# Patient Record
Sex: FEMALE | Race: WHITE | HISPANIC OR LATINO | Employment: UNEMPLOYED | URBAN - METROPOLITAN AREA
[De-identification: names, ages, dates, MRNs, and addresses within clinical notes are randomized per-mention and may not be internally consistent; named-entity substitution may affect disease eponyms.]

---

## 2019-03-08 ENCOUNTER — HOSPITAL ENCOUNTER (OUTPATIENT)
Facility: HOSPITAL | Age: 19
Discharge: HOME | End: 2019-03-09
Attending: OBSTETRICS & GYNECOLOGY | Admitting: OBSTETRICS & GYNECOLOGY
Payer: MEDICAID

## 2019-03-08 DIAGNOSIS — N93.9 VAGINAL BLEEDING: Primary | ICD-10-CM

## 2019-03-08 LAB
ABO + RH BLD: NORMAL
BASOPHILS # BLD AUTO: 0.02 K/UL
BASOPHILS NFR BLD: 0.1 %
BLD GP AB SCN CELLS X3 SERPL QL: NORMAL
DIFFERENTIAL METHOD: ABNORMAL
EOSINOPHIL # BLD AUTO: 0.2 K/UL
EOSINOPHIL NFR BLD: 1.7 %
ERYTHROCYTE [DISTWIDTH] IN BLOOD BY AUTOMATED COUNT: 13.6 %
HCT VFR BLD AUTO: 38.2 %
HGB BLD-MCNC: 12.3 G/DL
LYMPHOCYTES # BLD AUTO: 4 K/UL
LYMPHOCYTES NFR BLD: 29.5 %
MCH RBC QN AUTO: 28.1 PG
MCHC RBC AUTO-ENTMCNC: 32.2 G/DL
MCV RBC AUTO: 87 FL
MONOCYTES # BLD AUTO: 1.6 K/UL
MONOCYTES NFR BLD: 11.4 %
NEUTROPHILS # BLD AUTO: 7.8 K/UL
NEUTROPHILS NFR BLD: 57.3 %
PLATELET # BLD AUTO: 343 K/UL
PMV BLD AUTO: 11.3 FL
RBC # BLD AUTO: 4.37 M/UL
WBC # BLD AUTO: 13.65 K/UL

## 2019-03-08 PROCEDURE — 86901 BLOOD TYPING SEROLOGIC RH(D): CPT

## 2019-03-08 PROCEDURE — 25000003 PHARM REV CODE 250: Performed by: OBSTETRICS & GYNECOLOGY

## 2019-03-08 PROCEDURE — 85025 COMPLETE CBC W/AUTO DIFF WBC: CPT

## 2019-03-08 PROCEDURE — 36415 COLL VENOUS BLD VENIPUNCTURE: CPT

## 2019-03-08 RX ADMIN — SODIUM CHLORIDE, SODIUM LACTATE, POTASSIUM CHLORIDE, AND CALCIUM CHLORIDE 1000 ML: .6; .31; .03; .02 INJECTION, SOLUTION INTRAVENOUS at 10:03

## 2019-03-09 VITALS
HEIGHT: 58 IN | TEMPERATURE: 99 F | DIASTOLIC BLOOD PRESSURE: 66 MMHG | BODY MASS INDEX: 25.19 KG/M2 | HEART RATE: 96 BPM | RESPIRATION RATE: 16 BRPM | SYSTOLIC BLOOD PRESSURE: 125 MMHG | WEIGHT: 120 LBS

## 2019-03-09 LAB
BACTERIA #/AREA URNS HPF: ABNORMAL /HPF
BILIRUB UR QL STRIP: NEGATIVE
CLARITY UR: CLEAR
COLOR UR: COLORLESS
GLUCOSE UR QL STRIP: NEGATIVE
HGB UR QL STRIP: ABNORMAL
KETONES UR QL STRIP: NEGATIVE
LEUKOCYTE ESTERASE UR QL STRIP: ABNORMAL
MICROSCOPIC COMMENT: ABNORMAL
NITRITE UR QL STRIP: NEGATIVE
PH UR STRIP: 7 [PH] (ref 5–8)
PROT UR QL STRIP: NEGATIVE
RBC #/AREA URNS HPF: 0 /HPF (ref 0–4)
SP GR UR STRIP: 1 (ref 1–1.03)
SQUAMOUS #/AREA URNS HPF: 1 /HPF
URN SPEC COLLECT METH UR: ABNORMAL
UROBILINOGEN UR STRIP-ACNC: NEGATIVE EU/DL
WBC #/AREA URNS HPF: 2 /HPF (ref 0–5)
YEAST URNS QL MICRO: ABNORMAL

## 2019-03-09 PROCEDURE — 96361 HYDRATE IV INFUSION ADD-ON: CPT

## 2019-03-09 PROCEDURE — 87086 URINE CULTURE/COLONY COUNT: CPT

## 2019-03-09 PROCEDURE — 96360 HYDRATION IV INFUSION INIT: CPT

## 2019-03-09 PROCEDURE — 81000 URINALYSIS NONAUTO W/SCOPE: CPT

## 2019-03-09 PROCEDURE — 99211 OFF/OP EST MAY X REQ PHY/QHP: CPT

## 2019-03-09 NOTE — PROGRESS NOTES
History and Physical  Obstetrics      SUBJECTIVE:     Candida Parks is a 19 y.o.  female at 33w3d [facility] in [facility] reports a small amount of vaginal bleeding.  Reports LMP 18 and AILYN 19.  She denies LOF or ctx.  +FM.  Reports no problems in the pregnancy.         There is no problem list on file for this patient.    No medications prior to admission.     Review of patient's allergies indicates:  Allergies not on file   No past medical history on file.  No past surgical history on file.  No family history on file.  Social History     Tobacco Use    Smoking status: Not on file   Substance Use Topics    Alcohol use: Not on file    Drug use: Not on file        OBJECTIVE:     Vital Signs (Most Recent)  BP: ()/()   Arterial Line BP: ()/()     Physical Exam:  General:  Normal, alert and oriented   CV:  Regular rate   Lungs: Normal respiratory effort   Abd: Gravid, Nondistended, Nt,  No Guarding/rebounding   Extremeties: Nt, no significant assymetric swelling           Uterine Size:  c/w dates   Presentations:  vertex   FHT: reviewed   Pelvis: NEFG   SVE:       Laboratory:  No results for input(s): WBC, HGB, HCT, PLT in the last 168 hours.  No results for input(s): NA, K, CL, CO2, BUN, CREATININE, CALCIUM, PROT, BILITOT, ALKPHOS, ALT, AST, GLUCOSE in the last 168 hours.    Invalid input(s): LABALBU      ASSESSMENT/PLAN:     19 y.o. [id number] gestation   Vaginal bleeding  No care in US    Will get US and monitor fetus.  If US clear, will perform cervical exam.    Labs ordered.

## 2019-03-09 NOTE — DISCHARGE INSTRUCTIONS
Home Undelivered Discharge Instructions    After Discharge Orders:    No future appointments.        There are no discharge medications for this patient.              · Diet:  normal diet as tolerated    · Rest: normal activity as tolerated    Other instructions: Do kick counts once a day on your baby. Choose the time of day your baby is most active. Get in a comfortable lying or sitting position and time how long it takes to feel 10 kicks, twists, turns, swishes, or rolls. Call your physician or midwife if there have not been 10 kicks in 2 hours    Call physician or midwife, return to Labor and Delivery, call 911, or go to the nearest Emergency Room if: {call doctor:[id number]}        Home Undelivered Discharge Instructions    After Discharge Orders:    No future appointments.    Call physician or midwife's office on *** for instructions.    There are no discharge medications for this patient.              · Diet:  {diet:28879}    · Rest: {rest:23806}    Other instructions: Do kick counts once a day on your baby. Choose the time of day your baby is most active. Get in a comfortable lying or sitting position and time how long it takes to feel 10 kicks, twists, turns, swishes, or rolls. Call your physician or midwife if there have not been 10 kicks in {kick count number:04015} hours    Call physician or midwife, return to Labor and Delivery, call 911, or go to the nearest Emergency Room if: {call doctor:[id number]}          Understanding Preeclampsia  Preeclampsia is pregnancy-related hypertension that develops after 20 weeks' gestation. It can lead to health risks for you and your baby. No one knows what causes preeclampsia. But it is known that the only cure is delivery.     Your blood pressure will be monitored regularly throughout your pregnancy to help check for preeclampsia.   Signs and symptoms  A common sign of preeclampsia is high blood pressure. Other signs and symptoms may include:  · Rapid weight gain  · Protein in  your urine  · Headache  · Abdominal pain on your right side  · Vision problems (flashes or spots)  · Edema (swelling) in your face or hands (this also commonly happens near the end of normal pregnancies, even without preeclampsia)  Tests you may have  Your healthcare provider will want to check your blood pressure throughout your pregnancy. If your blood pressure is high, you may have the following tests:  · Urine tests to look for protein  · Blood tests to confirm preeclampsia  · Fetal monitoring to ensure that your baby is healthy  Treating preeclampsia  A daily low dose of aspirin may be prescribed to those at risk for preeclampsia. Preeclampsia almost always ends soon after you give birth. Until then, your healthcare provider can help manage your condition. If your symptoms are mild, you may need bed rest at home. If your symptoms are severe, you will be hospitalized. Hospital treatment includes:  · Complete bed rest to help control blood pressure  · Magnesium IV (intravenous) drip during labor to prevent seizures  · Induced labor or surgical delivery by  section  When to call your healthcare provider  Call your healthcare provider if swelling, weight gain, or other symptoms come on quickly or are severe. Some cases of preeclampsia are more severe than others. Your signs and symptoms also may change or worsen as you get closer to your due date.  Whos at risk?  Preeclampsia can happen in any pregnant woman. Factors that increase the risk include:  · Previous pregnancies. Preeclampsia, intrauterine growth retardation (IUGR),  birth, placental abruption, or fetal death  · Medical history of mother. Diabetes, high blood pressure, obesity, kidney disease, autoimmune disease (for example lupus), or family history of preeclampsia  · Current pregnancy. First pregnancy, multiple fetuses, over the age of 40 years, or in vitro fertilization  Dangers of preeclampsia  If not treated, preeclampsia can cause  problems for you and your baby. The placenta (organ that nourishes your baby) may tear away from the uterine wall. This can lead to fetal distress (the baby is at risk for health problems) and premature delivery. Preeclampsia can also cause these health problems:  · Kidney failure or other organ damage  · Seizures  · Stroke  Once you give birth  In most cases, preeclampsia goes away on its own soon after you give birth. Within days of delivery, your blood pressure, swelling, and other signs should decrease.  Date Last Reviewed: 6/1/2016  © 2248-2587 [employer]. [address] [place] [address]. All rights reserved. This information is not intended as a substitute for professional medical care. Always follow your healthcare professional's instructions.

## 2019-03-09 NOTE — TREATMENT PLAN
Pt and family given discharge instructions verbal and written in [place]. Patient and family verbalized understanding

## 2019-03-11 LAB — BACTERIA UR CULT: NORMAL
